# Patient Record
Sex: FEMALE | URBAN - METROPOLITAN AREA
[De-identification: names, ages, dates, MRNs, and addresses within clinical notes are randomized per-mention and may not be internally consistent; named-entity substitution may affect disease eponyms.]

---

## 2023-08-22 ENCOUNTER — APPOINTMENT (RX ONLY)
Dept: URBAN - METROPOLITAN AREA CLINIC 184 | Facility: CLINIC | Age: 73
Setting detail: DERMATOLOGY
End: 2023-08-22

## 2023-08-22 DIAGNOSIS — L72.8 OTHER FOLLICULAR CYSTS OF THE SKIN AND SUBCUTANEOUS TISSUE: ICD-10-CM

## 2023-08-22 DIAGNOSIS — L60.3 NAIL DYSTROPHY: ICD-10-CM | Status: INADEQUATELY CONTROLLED

## 2023-08-22 PROCEDURE — 99204 OFFICE O/P NEW MOD 45 MIN: CPT

## 2023-08-22 PROCEDURE — ? PRESCRIPTION MEDICATION MANAGEMENT

## 2023-08-22 PROCEDURE — ? TREATMENT REGIMEN

## 2023-08-22 PROCEDURE — ? COUNSELING

## 2023-08-22 ASSESSMENT — LOCATION ZONE DERM
LOCATION ZONE: FINGERNAIL
LOCATION ZONE: TRUNK

## 2023-08-22 ASSESSMENT — LOCATION SIMPLE DESCRIPTION DERM
LOCATION SIMPLE: UPPER BACK
LOCATION SIMPLE: LEFT RING FINGERNAIL

## 2023-08-22 ASSESSMENT — LOCATION DETAILED DESCRIPTION DERM
LOCATION DETAILED: LEFT RING FINGERNAIL
LOCATION DETAILED: INFERIOR THORACIC SPINE

## 2023-08-22 NOTE — PROCEDURE: TREATMENT REGIMEN
Detail Level: Zone
Otc Regimen: Shivam Nail Conditioner 1-2x/daily to affected nailplate and proximal nailfold

## 2023-08-22 NOTE — PROCEDURE: PRESCRIPTION MEDICATION MANAGEMENT
Detail Level: Zone
Render In Strict Bullet Format?: No
Plan: Schedule excision with Harish Sanchez NP